# Patient Record
Sex: FEMALE | Race: BLACK OR AFRICAN AMERICAN | NOT HISPANIC OR LATINO | Employment: OTHER | URBAN - METROPOLITAN AREA
[De-identification: names, ages, dates, MRNs, and addresses within clinical notes are randomized per-mention and may not be internally consistent; named-entity substitution may affect disease eponyms.]

---

## 2022-08-15 ENCOUNTER — PROCEDURE VISIT (OUTPATIENT)
Dept: UROLOGY | Facility: CLINIC | Age: 67
End: 2022-08-15
Payer: OTHER GOVERNMENT

## 2022-08-15 VITALS
WEIGHT: 204.63 LBS | DIASTOLIC BLOOD PRESSURE: 87 MMHG | SYSTOLIC BLOOD PRESSURE: 141 MMHG | HEART RATE: 86 BPM | BODY MASS INDEX: 32.12 KG/M2 | OXYGEN SATURATION: 99 % | HEIGHT: 67 IN | RESPIRATION RATE: 18 BRPM | TEMPERATURE: 98 F

## 2022-08-15 DIAGNOSIS — R31.21 ASYMPTOMATIC MICROSCOPIC HEMATURIA: Primary | ICD-10-CM

## 2022-08-15 RX ORDER — CIPROFLOXACIN 500 MG/1
500 TABLET ORAL
Status: COMPLETED | OUTPATIENT
Start: 2022-08-15 | End: 2022-08-15

## 2022-08-15 RX ADMIN — CIPROFLOXACIN 500 MG: 500 TABLET ORAL at 02:08

## 2022-08-15 NOTE — PROGRESS NOTES
Chief Complaint:   Chief Complaint   Patient presents with    Cystoscopy for microscopic hematuria     Cystoscopy for microscopic hematuria; CT Abd: [id number]; Referral: [id number]       HPI: Mrs. Whitt is a 67yo F who [facility].  She denies a history of gross hematuria, dysuria, UTI, irritative or obstructive LUTS.  There is a family history of prostate cancer.  The no chemical exposures during her  time. She's a nonsmoker. She does not take anticoagulant. Recent CT negative for acute  findings. Here for cysto.    Allergies:  Review of patient's allergies indicates:  No Known Allergies    Medications:  No current outpatient medications on file.     No current facility-administered medications for this visit.       Review of Systems:  General: No fever, chills, fatigability, or weight loss.  Skin: No rashes, itching, or changes in color or texture of skin.  Chest: Denies MURRAY, cyanosis, wheezing, cough, and sputum production.  Abdomen: Appetite fine. No weight loss. Denies diarrhea, abdominal pain, hematemesis, or blood in stool.  Musculoskeletal: No joint stiffness or swelling. Denies back pain.  : As above.  All other review of systems negative.    PMH:  Past Medical History:   Diagnosis Date    Allergies     Arthritis     Asthma     Tinnitus        PSH:  Past Surgical History:   Procedure Laterality Date    NASAL POLYP SURGERY      TYMPANOSTOMY TUBE PLACEMENT         FamHx:  History reviewed. No pertinent family history.    SocHx:  Social History     Socioeconomic History    Marital status: Single   Tobacco Use    Smoking status: Never Smoker    Smokeless tobacco: Never Used       Physical Exam:  Vitals:    08/15/22 1434   BP: (!) 141/87   Pulse: 86   Resp: 18   Temp: 98.4 °F (36.9 °C)     General: A&Ox3, no apparent distress, no deformities  Neck: No masses, normal thyroid  Lungs: CTA funmilayo, no use of accessory muscles  Heart: RRR, no arrhythmias  Abdomen:  Soft, NT, ND, no masses, no hernias, no hepatosplenomegaly  Lymphatic: Neck and groin nodes negative  Skin: The skin is warm and dry. No jaundice.  Ext: No c/c/e  GUFemale: External genitalia normal. No lesions. Meatus normal size and location. Urethra normal. No masses. Bladder normal. No fullness or masses. Vagina normal without discharge or lesions.       Procedure:  Informed consent was obtained. She was premedicated with an oral antibiotic. The vagina was prepped with topical betadine. A 17Fr flexible cystoscope was advanced per urethra into the bladder under direct vision. Bilateral Uo's were in orthotopic location. All bladder surfaces were evaluated. There was no evidence of papillary tumor, trabeculation, diverticula or stone. No urethral pathology. The scope was removed. She tolerated the procedure well.      Imaging:   CT - negative for  pathology per referral      Impression:  Problem List Items Addressed This Visit        Renal/    Asymptomatic microscopic hematuria            Plan:  CT/cysto negative. Repeat UA/micro 1 year      Radha Nugent MD  8/15/2022  Urology

## 2022-08-15 NOTE — PATIENT INSTRUCTIONS
Pt seen by Dr. Nugent. Cysto done at visit. Consents signed and obtained by staff. RTC 1 year. Discharge education given both written and verbal.

## 2022-12-19 ENCOUNTER — LAB VISIT (OUTPATIENT)
Dept: LAB | Facility: HOSPITAL | Age: 67
End: 2022-12-19
Attending: NURSE PRACTITIONER
Payer: OTHER GOVERNMENT

## 2022-12-19 DIAGNOSIS — K76.0 FATTY METAMORPHOSIS OF LIVER: ICD-10-CM

## 2022-12-19 DIAGNOSIS — Z79.899 ENCOUNTER FOR LONG-TERM (CURRENT) USE OF OTHER MEDICATIONS: Primary | ICD-10-CM

## 2022-12-19 DIAGNOSIS — A04.8 INTESTINAL INFECTION DUE TO AEROBACTER AEROGENES: ICD-10-CM

## 2022-12-19 DIAGNOSIS — K21.9 GASTROESOPHAGEAL REFLUX DISEASE, UNSPECIFIED WHETHER ESOPHAGITIS PRESENT: ICD-10-CM

## 2022-12-19 LAB
ALBUMIN SERPL-MCNC: 4.2 G/DL (ref 3.4–4.8)
ALBUMIN/GLOB SERPL: 1.3 RATIO (ref 1.1–2)
ALP SERPL-CCNC: 92 UNIT/L (ref 40–150)
ALT SERPL-CCNC: 27 UNIT/L (ref 0–55)
AST SERPL-CCNC: 24 UNIT/L (ref 5–34)
BASOPHILS # BLD AUTO: 0.07 X10(3)/MCL (ref 0–0.2)
BASOPHILS NFR BLD AUTO: 1.1 %
BILIRUBIN DIRECT+TOT PNL SERPL-MCNC: 0.3 MG/DL
BUN SERPL-MCNC: 13.9 MG/DL (ref 9.8–20.1)
CALCIUM SERPL-MCNC: 9.9 MG/DL (ref 8.4–10.2)
CHLORIDE SERPL-SCNC: 104 MMOL/L (ref 98–107)
CHOLEST SERPL-MCNC: 249 MG/DL
CHOLEST/HDLC SERPL: 5 {RATIO} (ref 0–5)
CO2 SERPL-SCNC: 26 MMOL/L (ref 23–31)
CREAT SERPL-MCNC: 0.84 MG/DL (ref 0.55–1.02)
CRP SERPL-MCNC: 14.1 MG/L
DEPRECATED CALCIDIOL+CALCIFEROL SERPL-MC: 34.3 NG/ML (ref 30–80)
EOSINOPHIL # BLD AUTO: 0.14 X10(3)/MCL (ref 0–0.9)
EOSINOPHIL NFR BLD AUTO: 2.2 %
ERYTHROCYTE [DISTWIDTH] IN BLOOD BY AUTOMATED COUNT: 12.6 % (ref 11–14.5)
ERYTHROCYTE [SEDIMENTATION RATE] IN BLOOD: 43 MM/HR (ref 0–20)
EST. AVERAGE GLUCOSE BLD GHB EST-MCNC: 139.9 MG/DL
GFR SERPLBLD CREATININE-BSD FMLA CKD-EPI: >60 MLS/MIN/1.73/M2
GGT SERPL-CCNC: 56 U/L (ref 9–36)
GLOBULIN SER-MCNC: 3.2 GM/DL (ref 2.4–3.5)
GLUCOSE SERPL-MCNC: 126 MG/DL (ref 82–115)
HBA1C MFR BLD: 6.5 %
HCT VFR BLD AUTO: 42.4 % (ref 37–47)
HDLC SERPL-MCNC: 52 MG/DL (ref 35–60)
HGB BLD-MCNC: 13.5 GM/DL (ref 12–16)
IMM GRANULOCYTES # BLD AUTO: 0.02 X10(3)/MCL (ref 0–0.04)
IMM GRANULOCYTES NFR BLD AUTO: 0.3 %
LDLC SERPL CALC-MCNC: 158 MG/DL (ref 50–140)
LYMPHOCYTES # BLD AUTO: 2.27 X10(3)/MCL (ref 0.6–4.6)
LYMPHOCYTES NFR BLD AUTO: 35.3 %
MCH RBC QN AUTO: 28.5 PG
MCHC RBC AUTO-ENTMCNC: 31.8 MG/DL (ref 33–36)
MCV RBC AUTO: 89.5 FL (ref 80–94)
MONOCYTES # BLD AUTO: 0.45 X10(3)/MCL (ref 0.1–1.3)
MONOCYTES NFR BLD AUTO: 7 %
NEUTROPHILS # BLD AUTO: 3.48 X10(3)/MCL (ref 2.1–9.2)
NEUTROPHILS NFR BLD AUTO: 54.1 %
NRBC BLD AUTO-RTO: 0 % (ref 0–1)
PLATELET # BLD AUTO: 268 X10(3)/MCL (ref 140–371)
PMV BLD AUTO: 10.5 FL (ref 9.4–12.4)
POTASSIUM SERPL-SCNC: 3.8 MMOL/L (ref 3.5–5.1)
PROT SERPL-MCNC: 7.4 GM/DL (ref 5.8–7.6)
RBC # BLD AUTO: 4.74 X10(6)/MCL (ref 4.2–5.4)
SODIUM SERPL-SCNC: 138 MMOL/L (ref 136–145)
TRIGL SERPL-MCNC: 197 MG/DL (ref 37–140)
VLDLC SERPL CALC-MCNC: 39 MG/DL
WBC # SPEC AUTO: 6.4 X10(3)/MCL (ref 4.5–11.5)

## 2022-12-19 PROCEDURE — 80053 COMPREHEN METABOLIC PANEL: CPT

## 2022-12-19 PROCEDURE — 85025 COMPLETE CBC W/AUTO DIFF WBC: CPT

## 2022-12-19 PROCEDURE — 85651 RBC SED RATE NONAUTOMATED: CPT

## 2022-12-19 PROCEDURE — 82306 VITAMIN D 25 HYDROXY: CPT

## 2022-12-19 PROCEDURE — 36415 COLL VENOUS BLD VENIPUNCTURE: CPT

## 2022-12-19 PROCEDURE — 83036 HEMOGLOBIN GLYCOSYLATED A1C: CPT

## 2022-12-19 PROCEDURE — 86140 C-REACTIVE PROTEIN: CPT

## 2022-12-19 PROCEDURE — 80061 LIPID PANEL: CPT

## 2022-12-19 PROCEDURE — 82977 ASSAY OF GGT: CPT

## 2023-01-27 ENCOUNTER — LAB REQUISITION (OUTPATIENT)
Dept: LAB | Facility: HOSPITAL | Age: 68
End: 2023-01-27
Payer: OTHER GOVERNMENT

## 2023-01-27 DIAGNOSIS — A04.8 OTHER SPECIFIED BACTERIAL INTESTINAL INFECTIONS: ICD-10-CM

## 2023-01-27 LAB — H. PYLORI STOOL: NEGATIVE

## 2023-01-27 PROCEDURE — 87338 HPYLORI STOOL AG IA: CPT | Performed by: INTERNAL MEDICINE

## 2023-02-08 ENCOUNTER — PATIENT MESSAGE (OUTPATIENT)
Dept: RESEARCH | Facility: HOSPITAL | Age: 68
End: 2023-02-08
Payer: OTHER GOVERNMENT

## 2023-04-25 ENCOUNTER — PATIENT MESSAGE (OUTPATIENT)
Dept: RESEARCH | Facility: HOSPITAL | Age: 68
End: 2023-04-25
Payer: OTHER GOVERNMENT

## 2023-05-09 ENCOUNTER — PATIENT MESSAGE (OUTPATIENT)
Dept: RESEARCH | Facility: HOSPITAL | Age: 68
End: 2023-05-09
Payer: OTHER GOVERNMENT

## 2023-09-07 ENCOUNTER — PATIENT MESSAGE (OUTPATIENT)
Dept: RESEARCH | Facility: HOSPITAL | Age: 68
End: 2023-09-07
Payer: OTHER GOVERNMENT